# Patient Record
Sex: MALE | URBAN - METROPOLITAN AREA
[De-identification: names, ages, dates, MRNs, and addresses within clinical notes are randomized per-mention and may not be internally consistent; named-entity substitution may affect disease eponyms.]

---

## 2019-02-13 ENCOUNTER — APPOINTMENT (RX ONLY)
Dept: URBAN - METROPOLITAN AREA CLINIC 281 | Facility: CLINIC | Age: 54
Setting detail: DERMATOLOGY
End: 2019-02-13

## 2019-02-13 DIAGNOSIS — L98.8 OTHER SPECIFIED DISORDERS OF THE SKIN AND SUBCUTANEOUS TISSUE: ICD-10-CM

## 2019-02-13 DIAGNOSIS — M67.4 GANGLION: ICD-10-CM

## 2019-02-13 DIAGNOSIS — D22 MELANOCYTIC NEVI: ICD-10-CM

## 2019-02-13 PROBLEM — M67.472 GANGLION, LEFT ANKLE AND FOOT: Status: ACTIVE | Noted: 2019-02-13

## 2019-02-13 PROBLEM — I10 ESSENTIAL (PRIMARY) HYPERTENSION: Status: ACTIVE | Noted: 2019-02-13

## 2019-02-13 PROBLEM — L57.0 ACTINIC KERATOSIS: Status: ACTIVE | Noted: 2019-02-13

## 2019-02-13 PROBLEM — D22.4 MELANOCYTIC NEVI OF SCALP AND NECK: Status: ACTIVE | Noted: 2019-02-13

## 2019-02-13 PROCEDURE — ? COUNSELING

## 2019-02-13 PROCEDURE — 99213 OFFICE O/P EST LOW 20 MIN: CPT

## 2019-02-13 ASSESSMENT — LOCATION ZONE DERM
LOCATION ZONE: LIP
LOCATION ZONE: TOE
LOCATION ZONE: SCALP

## 2019-02-13 ASSESSMENT — LOCATION DETAILED DESCRIPTION DERM
LOCATION DETAILED: LEFT SUPERIOR PARIETAL SCALP
LOCATION DETAILED: RIGHT INFERIOR VERMILION LIP
LOCATION DETAILED: LEFT DORSAL 2ND TOE

## 2019-02-13 ASSESSMENT — LOCATION SIMPLE DESCRIPTION DERM
LOCATION SIMPLE: LEFT 2ND TOE
LOCATION SIMPLE: SCALP
LOCATION SIMPLE: RIGHT LIP

## 2019-08-14 ENCOUNTER — APPOINTMENT (RX ONLY)
Dept: URBAN - METROPOLITAN AREA CLINIC 281 | Facility: CLINIC | Age: 54
Setting detail: DERMATOLOGY
End: 2019-08-14

## 2019-08-14 DIAGNOSIS — Q82.5 CONGENITAL NON-NEOPLASTIC NEVUS: ICD-10-CM

## 2019-08-14 DIAGNOSIS — D22 MELANOCYTIC NEVI: ICD-10-CM

## 2019-08-14 DIAGNOSIS — Z87.2 PERSONAL HISTORY OF DISEASES OF THE SKIN AND SUBCUTANEOUS TISSUE: ICD-10-CM

## 2019-08-14 DIAGNOSIS — L82.1 OTHER SEBORRHEIC KERATOSIS: ICD-10-CM

## 2019-08-14 DIAGNOSIS — L81.4 OTHER MELANIN HYPERPIGMENTATION: ICD-10-CM

## 2019-08-14 PROBLEM — D22.5 MELANOCYTIC NEVI OF TRUNK: Status: ACTIVE | Noted: 2019-08-14

## 2019-08-14 PROCEDURE — 99213 OFFICE O/P EST LOW 20 MIN: CPT

## 2019-08-14 PROCEDURE — ? COUNSELING

## 2019-08-14 PROCEDURE — ? INVENTORY

## 2019-08-14 ASSESSMENT — LOCATION SIMPLE DESCRIPTION DERM
LOCATION SIMPLE: POSTERIOR NECK
LOCATION SIMPLE: RIGHT LOWER BACK
LOCATION SIMPLE: LEFT UPPER BACK
LOCATION SIMPLE: UPPER BACK

## 2019-08-14 ASSESSMENT — LOCATION DETAILED DESCRIPTION DERM
LOCATION DETAILED: RIGHT POSTERIOR NECK
LOCATION DETAILED: RIGHT SUPERIOR LATERAL LOWER BACK
LOCATION DETAILED: LEFT SUPERIOR UPPER BACK
LOCATION DETAILED: INFERIOR THORACIC SPINE

## 2019-08-14 ASSESSMENT — LOCATION ZONE DERM
LOCATION ZONE: TRUNK
LOCATION ZONE: NECK

## 2020-04-29 ENCOUNTER — APPOINTMENT (OUTPATIENT)
Dept: URBAN - METROPOLITAN AREA CLINIC 285 | Age: 55
Setting detail: DERMATOLOGY
End: 2020-04-29

## 2020-04-29 DIAGNOSIS — B08.1 MOLLUSCUM CONTAGIOSUM: ICD-10-CM

## 2020-04-29 PROCEDURE — OTHER SEXUALLY TRANSMITTED INFECTION COUNSELING: OTHER

## 2020-04-29 PROCEDURE — OTHER LIQUID NITROGEN: OTHER

## 2020-04-29 PROCEDURE — 17110 DESTRUCT B9 LESION 1-14: CPT

## 2020-04-29 PROCEDURE — OTHER COUNSELING: OTHER

## 2020-04-29 ASSESSMENT — LOCATION ZONE DERM
LOCATION ZONE: TRUNK
LOCATION ZONE: GENITALIA

## 2020-04-29 ASSESSMENT — LOCATION DETAILED DESCRIPTION DERM
LOCATION DETAILED: SUPRAPUBIC SKIN
LOCATION DETAILED: GENITALIA

## 2020-04-29 ASSESSMENT — LOCATION SIMPLE DESCRIPTION DERM
LOCATION SIMPLE: GENITALIA
LOCATION SIMPLE: GROIN

## 2020-04-29 NOTE — HPI: RASH
How Severe Is Your Rash?: mild
Is This A New Presentation, Or A Follow-Up?: Rash
Additional History: Patient states that he was given mupirocin by [facility] also did std testing to use for two weeks. Patient states that he self diagnosed himself and has been using compound w a couple of times a day. He states that he has been going through a divorce and had been traveling to different places and had a new girlfriend that he is no longer with he thinks he may have mulloscum.

## 2020-04-29 NOTE — PROCEDURE: SEXUALLY TRANSMITTED INFECTION COUNSELING
Detail Level: Detailed
Genital Herpes Counseling: Herpes infection on the genitalia or buttocks is considered a sexually transmitted infection. I recommend screening for syphilis and HIV.
Chlamydia Counseling: Chlamydia is a sexually transmitted infection. I recommend screening for HIV, syphilis and gonorrhea.
Chancroid Counseling: Chancroid is a sexually transmitted infection. I recommend screening for syphilis and HIV.
Syphilis Counseling: Syphilis is a sexually transmitted infection. I recommend screening for HIV.
Condyloma Counseling: Warts on the genitalia in individuals who are sexually active are considered a sexually transmitted infection. I recommend screening for syphilis and HIV.
Hiv Counseling: HIV can be a sexually transmitted infection. I recommend screening for syphilis.
Molluscum Counseling: Molluscum lesions on the genitalia in individuals who are sexually active are considered a sexually transmitted infection.
Reactive Arthritis Counseling: This condition can be caused by a sexually transmitted infection. I recommend screening for HIV, syphilis, gonorrhea and chlamydia.
Gonorrhea Counseling: Granuloma inguinale is a sexually transmitted infection. I recommend screening for HIV, syphilis, gonorrhea and chlamydia.
Patient Not Interested Counseling: The patient is not interested in testing for sexually transmitted infections at this time.
Patient Interested In Sti Testing?: Yes
Trichomonas Counseling: Trichomonas is a sexually transmitted infection. I recommend screening for HIV, syphilis, gonorrhea and chlamydia.
Gonorrhea Counseling: Gonorrhea is a sexually transmitted infection. I recommend screening for HIV, syphilis and chlamydia.

## 2020-05-20 ENCOUNTER — APPOINTMENT (OUTPATIENT)
Dept: URBAN - METROPOLITAN AREA CLINIC 285 | Age: 55
Setting detail: DERMATOLOGY
End: 2020-05-20

## 2020-05-20 DIAGNOSIS — B08.1 MOLLUSCUM CONTAGIOSUM: ICD-10-CM

## 2020-05-20 PROCEDURE — OTHER COUNSELING: OTHER

## 2020-05-20 PROCEDURE — OTHER LIQUID NITROGEN: OTHER

## 2020-05-20 PROCEDURE — 17111 DESTRUCT LESION 15 OR MORE: CPT

## 2020-05-20 ASSESSMENT — LOCATION SIMPLE DESCRIPTION DERM
LOCATION SIMPLE: GENITALIA
LOCATION SIMPLE: PENIS
LOCATION SIMPLE: GROIN

## 2020-05-20 ASSESSMENT — LOCATION ZONE DERM
LOCATION ZONE: PENIS
LOCATION ZONE: GENITALIA
LOCATION ZONE: TRUNK

## 2020-05-20 ASSESSMENT — LOCATION DETAILED DESCRIPTION DERM
LOCATION DETAILED: DORSAL PENILE SHAFT
LOCATION DETAILED: GENITALIA
LOCATION DETAILED: SUPRAPUBIC SKIN

## 2020-06-09 ENCOUNTER — APPOINTMENT (OUTPATIENT)
Dept: URBAN - METROPOLITAN AREA CLINIC 285 | Age: 55
Setting detail: DERMATOLOGY
End: 2020-06-09

## 2020-06-09 DIAGNOSIS — B08.1 MOLLUSCUM CONTAGIOSUM: ICD-10-CM

## 2020-06-09 PROCEDURE — OTHER LIQUID NITROGEN: OTHER

## 2020-06-09 PROCEDURE — OTHER COUNSELING: OTHER

## 2020-06-09 PROCEDURE — 17110 DESTRUCT B9 LESION 1-14: CPT

## 2020-06-09 PROCEDURE — OTHER SEXUALLY TRANSMITTED INFECTION COUNSELING: OTHER

## 2020-06-09 ASSESSMENT — LOCATION ZONE DERM
LOCATION ZONE: GENITALIA
LOCATION ZONE: TRUNK

## 2020-06-09 ASSESSMENT — LOCATION SIMPLE DESCRIPTION DERM
LOCATION SIMPLE: GENITALIA
LOCATION SIMPLE: GROIN

## 2020-06-09 ASSESSMENT — LOCATION DETAILED DESCRIPTION DERM
LOCATION DETAILED: SUPRAPUBIC SKIN
LOCATION DETAILED: GENITALIA

## 2020-06-09 NOTE — PROCEDURE: SEXUALLY TRANSMITTED INFECTION COUNSELING
Generic Sti Counseling: I recommend screening for syphilis and HIV.
Patient Interested In Sti Testing?: Yes
Gonorrhea Counseling: Gonorrhea is a sexually transmitted infection. I recommend screening for HIV, syphilis and chlamydia.
Genital Herpes Counseling: Herpes infection on the genitalia or buttocks is considered a sexually transmitted infection. I recommend screening for syphilis and HIV.
Chancroid Counseling: Chancroid is a sexually transmitted infection. I recommend screening for syphilis and HIV.
Trichomonas Counseling: Trichomonas is a sexually transmitted infection. I recommend screening for HIV, syphilis, gonorrhea and chlamydia.
Reactive Arthritis Counseling: This condition can be caused by a sexually transmitted infection. I recommend screening for HIV, syphilis, gonorrhea and chlamydia.
Patient Not Interested Counseling: The patient is not interested in testing for sexually transmitted infections at this time.
Gonorrhea Counseling: Granuloma inguinale is a sexually transmitted infection. I recommend screening for HIV, syphilis, gonorrhea and chlamydia.
Molluscum Counseling: Molluscum lesions on the genitalia in individuals who are sexually active are considered a sexually transmitted infection. I recommend screening for syphilis and HIV.
Condyloma Counseling: Warts on the genitalia in individuals who are sexually active are considered a sexually transmitted infection. I recommend screening for syphilis and HIV.
Detail Level: Detailed
Chlamydia Counseling: Chlamydia is a sexually transmitted infection. I recommend screening for HIV, syphilis and gonorrhea.
Hiv Counseling: HIV can be a sexually transmitted infection. I recommend screening for syphilis.
Syphilis Counseling: Syphilis is a sexually transmitted infection. I recommend screening for HIV.

## 2020-06-30 ENCOUNTER — APPOINTMENT (OUTPATIENT)
Dept: URBAN - METROPOLITAN AREA CLINIC 285 | Age: 55
Setting detail: DERMATOLOGY
End: 2020-06-30

## 2020-06-30 DIAGNOSIS — B08.1 MOLLUSCUM CONTAGIOSUM: ICD-10-CM

## 2020-06-30 PROCEDURE — OTHER LIQUID NITROGEN: OTHER

## 2020-06-30 PROCEDURE — OTHER SEXUALLY TRANSMITTED INFECTION COUNSELING: OTHER

## 2020-06-30 PROCEDURE — OTHER COUNSELING: OTHER

## 2020-06-30 PROCEDURE — 17110 DESTRUCT B9 LESION 1-14: CPT

## 2020-06-30 ASSESSMENT — LOCATION ZONE DERM
LOCATION ZONE: TRUNK
LOCATION ZONE: PENIS
LOCATION ZONE: LEG

## 2020-06-30 ASSESSMENT — LOCATION SIMPLE DESCRIPTION DERM
LOCATION SIMPLE: PENIS
LOCATION SIMPLE: ABDOMEN
LOCATION SIMPLE: GROIN
LOCATION SIMPLE: LEFT THIGH

## 2020-06-30 ASSESSMENT — LOCATION DETAILED DESCRIPTION DERM
LOCATION DETAILED: VENTRAL PENILE SHAFT
LOCATION DETAILED: SUPRAPUBIC SKIN
LOCATION DETAILED: LEFT ANTERIOR PROXIMAL THIGH
LOCATION DETAILED: LEFT INGUINAL CREASE
LOCATION DETAILED: PERIUMBILICAL SKIN

## 2020-06-30 NOTE — PROCEDURE: SEXUALLY TRANSMITTED INFECTION COUNSELING
Detail Level: Detailed
Chlamydia Counseling: Chlamydia is a sexually transmitted infection. I recommend screening for HIV, syphilis and gonorrhea.
Hiv Counseling: HIV can be a sexually transmitted infection. I recommend screening for syphilis.
Syphilis Counseling: Syphilis is a sexually transmitted infection. I recommend screening for HIV.
Gonorrhea Counseling: Gonorrhea is a sexually transmitted infection. I recommend screening for HIV, syphilis and chlamydia.
Generic Sti Counseling: I recommend screening for syphilis and HIV.
Patient Interested In Sti Testing?: Yes
Genital Herpes Counseling: Herpes infection on the genitalia or buttocks is considered a sexually transmitted infection. I recommend screening for syphilis and HIV.
Chancroid Counseling: Chancroid is a sexually transmitted infection. I recommend screening for syphilis and HIV.
Trichomonas Counseling: Trichomonas is a sexually transmitted infection. I recommend screening for HIV, syphilis, gonorrhea and chlamydia.
Reactive Arthritis Counseling: This condition can be caused by a sexually transmitted infection. I recommend screening for HIV, syphilis, gonorrhea and chlamydia.
Gonorrhea Counseling: Granuloma inguinale is a sexually transmitted infection. I recommend screening for HIV, syphilis, gonorrhea and chlamydia.
Patient Not Interested Counseling: The patient is not interested in testing for sexually transmitted infections at this time.
Molluscum Counseling: Molluscum lesions on the genitalia in individuals who are sexually active are considered a sexually transmitted infection. I recommend screening for syphilis and HIV.
Condyloma Counseling: Warts on the genitalia in individuals who are sexually active are considered a sexually transmitted infection. I recommend screening for syphilis and HIV.

## 2020-06-30 NOTE — PROCEDURE: LIQUID NITROGEN
Consent: The patient's consent was obtained including but not limited to risks of crusting, scabbing, blistering, scarring, darker or lighter pigmentary change, recurrence, incomplete removal and infection.
Medical Necessity Information: It is in your best interest to select a reason for this procedure from the list below. All of these items fulfill various CMS LCD requirements except the new and changing color options.
Detail Level: Detailed
Render Post-Care Instructions In Note?: no
Post-Care Instructions: I reviewed with the patient in detail post-care instructions. Patient is to wear sunprotection, and avoid picking at any of the treated lesions. Pt may apply Vaseline to crusted or scabbing areas.
Medical Necessity Clause: This procedure was medically necessary because the lesions that were treated were:

## 2020-07-14 ENCOUNTER — APPOINTMENT (OUTPATIENT)
Dept: URBAN - METROPOLITAN AREA CLINIC 285 | Age: 55
Setting detail: DERMATOLOGY
End: 2020-07-14

## 2020-07-14 DIAGNOSIS — B08.1 MOLLUSCUM CONTAGIOSUM: ICD-10-CM

## 2020-07-14 DIAGNOSIS — B07.8 OTHER VIRAL WARTS: ICD-10-CM

## 2020-07-14 PROCEDURE — 99213 OFFICE O/P EST LOW 20 MIN: CPT | Mod: 25

## 2020-07-14 PROCEDURE — OTHER SEXUALLY TRANSMITTED INFECTION COUNSELING: OTHER

## 2020-07-14 PROCEDURE — OTHER LIQUID NITROGEN: OTHER

## 2020-07-14 PROCEDURE — OTHER COUNSELING: OTHER

## 2020-07-14 PROCEDURE — 17110 DESTRUCT B9 LESION 1-14: CPT

## 2020-07-14 ASSESSMENT — LOCATION SIMPLE DESCRIPTION DERM
LOCATION SIMPLE: RIGHT PLANTAR SURFACE
LOCATION SIMPLE: PLANTAR SURFACE OF RIGHT 3RD TOE
LOCATION SIMPLE: PENIS
LOCATION SIMPLE: GENITALIA

## 2020-07-14 ASSESSMENT — LOCATION DETAILED DESCRIPTION DERM
LOCATION DETAILED: RIGHT PLANTAR FOREFOOT OVERLYING 4TH METATARSAL
LOCATION DETAILED: GENITALIA
LOCATION DETAILED: RIGHT DORSAL SHAFT OF PENIS
LOCATION DETAILED: RIGHT LATERAL PLANTAR 3RD TOE

## 2020-07-14 ASSESSMENT — LOCATION ZONE DERM
LOCATION ZONE: GENITALIA
LOCATION ZONE: FEET
LOCATION ZONE: TOE
LOCATION ZONE: PENIS

## 2020-07-14 NOTE — PROCEDURE: LIQUID NITROGEN
Post-Care Instructions: I reviewed with the patient in detail post-care instructions. Patient is to wear sunprotection, and avoid picking at any of the treated lesions. Pt may apply Vaseline to crusted or scabbing areas.
Add 52 Modifier (Optional): no
Medical Necessity Clause: This procedure was medically necessary because the lesions that were treated were:
Detail Level: Detailed
Number Of Freeze-Thaw Cycles: 2 freeze-thaw cycles
Consent: The patient's consent was obtained including but not limited to risks of crusting, scabbing, blistering, scarring, darker or lighter pigmentary change, recurrence, incomplete removal and infection.
Medical Necessity Information: It is in your best interest to select a reason for this procedure from the list below. All of these items fulfill various CMS LCD requirements except the new and changing color options.

## 2020-07-14 NOTE — PROCEDURE: SEXUALLY TRANSMITTED INFECTION COUNSELING
Hiv Counseling: HIV can be a sexually transmitted infection. I recommend screening for syphilis.
Syphilis Counseling: Syphilis is a sexually transmitted infection. I recommend screening for HIV.
Gonorrhea Counseling: Gonorrhea is a sexually transmitted infection. I recommend screening for HIV, syphilis and chlamydia.
Generic Sti Counseling: I recommend screening for syphilis and HIV.
Patient Interested In Sti Testing?: Yes
Patient Not Interested Counseling: The patient is not interested in testing for sexually transmitted infections at this time.
Genital Herpes Counseling: Herpes infection on the genitalia or buttocks is considered a sexually transmitted infection. I recommend screening for syphilis and HIV.
Chancroid Counseling: Chancroid is a sexually transmitted infection. I recommend screening for syphilis and HIV.
Molluscum Counseling: Molluscum lesions on the genitalia in individuals who are sexually active are considered a sexually transmitted infection. I recommend screening for syphilis and HIV.
Trichomonas Counseling: Trichomonas is a sexually transmitted infection. I recommend screening for HIV, syphilis, gonorrhea and chlamydia.
Reactive Arthritis Counseling: This condition can be caused by a sexually transmitted infection. I recommend screening for HIV, syphilis, gonorrhea and chlamydia.
Gonorrhea Counseling: Granuloma inguinale is a sexually transmitted infection. I recommend screening for HIV, syphilis, gonorrhea and chlamydia.
Condyloma Counseling: Warts on the genitalia in individuals who are sexually active are considered a sexually transmitted infection. I recommend screening for syphilis and HIV.
Detail Level: Detailed
Chlamydia Counseling: Chlamydia is a sexually transmitted infection. I recommend screening for HIV, syphilis and gonorrhea.

## 2020-08-04 ENCOUNTER — APPOINTMENT (OUTPATIENT)
Dept: URBAN - METROPOLITAN AREA CLINIC 285 | Age: 55
Setting detail: DERMATOLOGY
End: 2020-08-04

## 2020-08-04 DIAGNOSIS — B07.8 OTHER VIRAL WARTS: ICD-10-CM

## 2020-08-04 DIAGNOSIS — B08.1 MOLLUSCUM CONTAGIOSUM: ICD-10-CM

## 2020-08-04 PROCEDURE — OTHER COUNSELING: OTHER

## 2020-08-04 PROCEDURE — OTHER LIQUID NITROGEN: OTHER

## 2020-08-04 PROCEDURE — 17111 DESTRUCT LESION 15 OR MORE: CPT

## 2020-08-04 PROCEDURE — OTHER SEXUALLY TRANSMITTED INFECTION COUNSELING: OTHER

## 2020-08-04 ASSESSMENT — LOCATION ZONE DERM
LOCATION ZONE: FEET
LOCATION ZONE: GENITALIA
LOCATION ZONE: PENIS
LOCATION ZONE: TRUNK
LOCATION ZONE: TOE

## 2020-08-04 ASSESSMENT — LOCATION DETAILED DESCRIPTION DERM
LOCATION DETAILED: RIGHT DORSAL SHAFT OF PENIS
LOCATION DETAILED: RIGHT SCROTUM
LOCATION DETAILED: SUPRAPUBIC SKIN
LOCATION DETAILED: RIGHT PLANTAR FOREFOOT OVERLYING 4TH METATARSAL
LOCATION DETAILED: RIGHT LATERAL PLANTAR 3RD TOE
LOCATION DETAILED: LEFT SCROTUM

## 2020-08-04 ASSESSMENT — LOCATION SIMPLE DESCRIPTION DERM
LOCATION SIMPLE: GROIN
LOCATION SIMPLE: SCROTUM
LOCATION SIMPLE: PLANTAR SURFACE OF RIGHT 3RD TOE
LOCATION SIMPLE: PENIS
LOCATION SIMPLE: RIGHT PLANTAR SURFACE

## 2020-08-04 NOTE — PROCEDURE: SEXUALLY TRANSMITTED INFECTION COUNSELING
Genital Herpes Counseling: Herpes infection on the genitalia or buttocks is considered a sexually transmitted infection. I recommend screening for syphilis and HIV.
Chlamydia Counseling: Chlamydia is a sexually transmitted infection. I recommend screening for HIV, syphilis and gonorrhea.
Trichomonas Counseling: Trichomonas is a sexually transmitted infection. I recommend screening for HIV, syphilis, gonorrhea and chlamydia.
Syphilis Counseling: Syphilis is a sexually transmitted infection. I recommend screening for HIV.
Patient Not Interested Counseling: The patient is not interested in testing for sexually transmitted infections at this time.
Generic Sti Counseling: I recommend screening for syphilis and HIV.
Molluscum Counseling: Molluscum lesions on the genitalia in individuals who are sexually active are considered a sexually transmitted infection. I recommend screening for syphilis and HIV.
Chancroid Counseling: Chancroid is a sexually transmitted infection. I recommend screening for syphilis and HIV.
Reactive Arthritis Counseling: This condition can be caused by a sexually transmitted infection. I recommend screening for HIV, syphilis, gonorrhea and chlamydia.
Gonorrhea Counseling: Granuloma inguinale is a sexually transmitted infection. I recommend screening for HIV, syphilis, gonorrhea and chlamydia.
Detail Level: Detailed
Condyloma Counseling: Warts on the genitalia in individuals who are sexually active are considered a sexually transmitted infection. I recommend screening for syphilis and HIV.
Hiv Counseling: HIV can be a sexually transmitted infection. I recommend screening for syphilis.
Gonorrhea Counseling: Gonorrhea is a sexually transmitted infection. I recommend screening for HIV, syphilis and chlamydia.
Patient Interested In Sti Testing?: Yes

## 2020-08-04 NOTE — PROCEDURE: LIQUID NITROGEN
Consent: The patient's consent was obtained including but not limited to risks of crusting, scabbing, blistering, scarring, darker or lighter pigmentary change, recurrence, incomplete removal and infection.
Add 52 Modifier (Optional): no
Detail Level: Detailed
Number Of Freeze-Thaw Cycles: 2 freeze-thaw cycles
Medical Necessity Clause: This procedure was medically necessary because the lesions that were treated were:
Medical Necessity Information: It is in your best interest to select a reason for this procedure from the list below. All of these items fulfill various CMS LCD requirements except the new and changing color options.
Post-Care Instructions: I reviewed with the patient in detail post-care instructions. Patient is to wear sunprotection, and avoid picking at any of the treated lesions. Pt may apply Vaseline to crusted or scabbing areas.

## 2020-08-21 ENCOUNTER — APPOINTMENT (OUTPATIENT)
Dept: URBAN - METROPOLITAN AREA CLINIC 285 | Age: 55
Setting detail: DERMATOLOGY
End: 2020-08-21

## 2020-08-21 DIAGNOSIS — B08.1 MOLLUSCUM CONTAGIOSUM: ICD-10-CM

## 2020-08-21 DIAGNOSIS — B07.8 OTHER VIRAL WARTS: ICD-10-CM

## 2020-08-21 PROCEDURE — OTHER LIQUID NITROGEN: OTHER

## 2020-08-21 PROCEDURE — OTHER COUNSELING: OTHER

## 2020-08-21 PROCEDURE — 17111 DESTRUCT LESION 15 OR MORE: CPT

## 2020-08-21 PROCEDURE — OTHER SEXUALLY TRANSMITTED INFECTION COUNSELING: OTHER

## 2020-08-21 PROCEDURE — 99213 OFFICE O/P EST LOW 20 MIN: CPT | Mod: 25

## 2020-08-21 ASSESSMENT — LOCATION ZONE DERM
LOCATION ZONE: GENITALIA
LOCATION ZONE: TRUNK
LOCATION ZONE: SCROTUM
LOCATION ZONE: LEG
LOCATION ZONE: TOE
LOCATION ZONE: FEET
LOCATION ZONE: PENIS

## 2020-08-21 ASSESSMENT — LOCATION DETAILED DESCRIPTION DERM
LOCATION DETAILED: RIGHT DORSAL SHAFT OF PENIS
LOCATION DETAILED: RIGHT LATERAL PLANTAR 3RD TOE
LOCATION DETAILED: RIGHT SCROTUM
LOCATION DETAILED: SUPRAPUBIC SKIN
LOCATION DETAILED: EPIGASTRIC SKIN
LOCATION DETAILED: LEFT ANTERIOR PROXIMAL THIGH
LOCATION DETAILED: BASE OF THE PENIS
LOCATION DETAILED: LEFT SCROTUM
LOCATION DETAILED: LEFT INGUINAL CREASE
LOCATION DETAILED: RIGHT ANTERIOR PROXIMAL THIGH
LOCATION DETAILED: SUPERIOR MIDLINE SCROTUM
LOCATION DETAILED: LEFT DORSAL SHAFT OF PENIS
LOCATION DETAILED: RIGHT PLANTAR FOREFOOT OVERLYING 4TH METATARSAL

## 2020-08-21 ASSESSMENT — LOCATION SIMPLE DESCRIPTION DERM
LOCATION SIMPLE: SUPERIOR SCROTUM
LOCATION SIMPLE: SCROTUM
LOCATION SIMPLE: RIGHT PLANTAR SURFACE
LOCATION SIMPLE: PLANTAR SURFACE OF RIGHT 3RD TOE
LOCATION SIMPLE: PENIS
LOCATION SIMPLE: LEFT THIGH
LOCATION SIMPLE: RIGHT THIGH
LOCATION SIMPLE: GROIN
LOCATION SIMPLE: ABDOMEN

## 2020-08-21 NOTE — PROCEDURE: SEXUALLY TRANSMITTED INFECTION COUNSELING
Detail Level: Detailed
Chlamydia Counseling: Chlamydia is a sexually transmitted infection. I recommend screening for HIV, syphilis and gonorrhea.
Hiv Counseling: HIV can be a sexually transmitted infection. I recommend screening for syphilis.
Syphilis Counseling: Syphilis is a sexually transmitted infection. I recommend screening for HIV.
Generic Sti Counseling: I recommend screening for syphilis and HIV.
Patient Interested In Sti Testing?: Yes
Gonorrhea Counseling: Gonorrhea is a sexually transmitted infection. I recommend screening for HIV, syphilis and chlamydia.
Genital Herpes Counseling: Herpes infection on the genitalia or buttocks is considered a sexually transmitted infection. I recommend screening for syphilis and HIV.
Chancroid Counseling: Chancroid is a sexually transmitted infection. I recommend screening for syphilis and HIV.
Trichomonas Counseling: Trichomonas is a sexually transmitted infection. I recommend screening for HIV, syphilis, gonorrhea and chlamydia.
Reactive Arthritis Counseling: This condition can be caused by a sexually transmitted infection. I recommend screening for HIV, syphilis, gonorrhea and chlamydia.
Patient Not Interested Counseling: The patient is not interested in testing for sexually transmitted infections at this time.
Gonorrhea Counseling: Granuloma inguinale is a sexually transmitted infection. I recommend screening for HIV, syphilis, gonorrhea and chlamydia.
Molluscum Counseling: Molluscum lesions on the genitalia in individuals who are sexually active are considered a sexually transmitted infection. I recommend screening for syphilis and HIV.
Condyloma Counseling: Warts on the genitalia in individuals who are sexually active are considered a sexually transmitted infection. I recommend screening for syphilis and HIV.

## 2020-08-21 NOTE — PROCEDURE: LIQUID NITROGEN
Render Note In Bullet Format When Appropriate: No
Post-Care Instructions: I reviewed with the patient in detail post-care instructions. Patient is to wear sunprotection, and avoid picking at any of the treated lesions. Pt may apply Vaseline to crusted or scabbing areas.
Detail Level: Detailed
Medical Necessity Information: It is in your best interest to select a reason for this procedure from the list below. All of these items fulfill various CMS LCD requirements except the new and changing color options.
Number Of Freeze-Thaw Cycles: 2 freeze-thaw cycles
Medical Necessity Clause: This procedure was medically necessary because the lesions that were treated were:
Consent: The patient's consent was obtained including but not limited to risks of crusting, scabbing, blistering, scarring, darker or lighter pigmentary change, recurrence, incomplete removal and infection.

## 2020-09-09 ENCOUNTER — APPOINTMENT (OUTPATIENT)
Dept: URBAN - METROPOLITAN AREA CLINIC 285 | Age: 55
Setting detail: DERMATOLOGY
End: 2020-09-09

## 2020-09-09 DIAGNOSIS — B08.1 MOLLUSCUM CONTAGIOSUM: ICD-10-CM

## 2020-09-09 DIAGNOSIS — B07.8 OTHER VIRAL WARTS: ICD-10-CM

## 2020-09-09 PROCEDURE — 17110 DESTRUCT B9 LESION 1-14: CPT

## 2020-09-09 PROCEDURE — OTHER COUNSELING: OTHER

## 2020-09-09 PROCEDURE — OTHER LIQUID NITROGEN: OTHER

## 2020-09-09 PROCEDURE — OTHER SEXUALLY TRANSMITTED INFECTION COUNSELING: OTHER

## 2020-09-09 ASSESSMENT — LOCATION DETAILED DESCRIPTION DERM
LOCATION DETAILED: RIGHT SUPRAPUBIC REGION
LOCATION DETAILED: RIGHT PENIS BASE
LOCATION DETAILED: RIGHT PLANTAR FOREFOOT OVERLYING 4TH METATARSAL
LOCATION DETAILED: RIGHT LATERAL PLANTAR 3RD TOE
LOCATION DETAILED: PERIUMBILICAL SKIN

## 2020-09-09 ASSESSMENT — LOCATION SIMPLE DESCRIPTION DERM
LOCATION SIMPLE: PLANTAR SURFACE OF RIGHT 3RD TOE
LOCATION SIMPLE: RIGHT PLANTAR SURFACE
LOCATION SIMPLE: RIGHT PENIS
LOCATION SIMPLE: ABDOMEN
LOCATION SIMPLE: RIGHT SUPRAPUBIC REGION

## 2020-09-09 ASSESSMENT — LOCATION ZONE DERM
LOCATION ZONE: FEET
LOCATION ZONE: PENIS
LOCATION ZONE: TOE
LOCATION ZONE: TRUNK

## 2020-09-09 NOTE — PROCEDURE: SEXUALLY TRANSMITTED INFECTION COUNSELING
Detail Level: Detailed
Trichomonas Counseling: Trichomonas is a sexually transmitted infection. I recommend screening for HIV, syphilis, gonorrhea and chlamydia.
Gonorrhea Counseling: Granuloma inguinale is a sexually transmitted infection. I recommend screening for HIV, syphilis, gonorrhea and chlamydia.
Hiv Counseling: HIV can be a sexually transmitted infection. I recommend screening for syphilis.
Syphilis Counseling: Syphilis is a sexually transmitted infection. I recommend screening for HIV.
Gonorrhea Counseling: Gonorrhea is a sexually transmitted infection. I recommend screening for HIV, syphilis and chlamydia.
Reactive Arthritis Counseling: This condition can be caused by a sexually transmitted infection. I recommend screening for HIV, syphilis, gonorrhea and chlamydia.
Chancroid Counseling: Chancroid is a sexually transmitted infection. I recommend screening for syphilis and HIV.
Patient Interested In Sti Testing?: Yes
Patient Not Interested Counseling: The patient is not interested in testing for sexually transmitted infections at this time.
Chlamydia Counseling: Chlamydia is a sexually transmitted infection. I recommend screening for HIV, syphilis and gonorrhea.
Genital Herpes Counseling: Herpes infection on the genitalia or buttocks is considered a sexually transmitted infection. I recommend screening for syphilis and HIV.
Condyloma Counseling: Warts on the genitalia in individuals who are sexually active are considered a sexually transmitted infection. I recommend screening for syphilis and HIV.
Molluscum Counseling: Molluscum lesions on the genitalia in individuals who are sexually active are considered a sexually transmitted infection. I recommend screening for syphilis and HIV.
Generic Sti Counseling: I recommend screening for syphilis and HIV.

## 2020-09-23 ENCOUNTER — APPOINTMENT (OUTPATIENT)
Dept: URBAN - METROPOLITAN AREA CLINIC 285 | Age: 55
Setting detail: DERMATOLOGY
End: 2020-09-23

## 2020-09-23 DIAGNOSIS — B07.8 OTHER VIRAL WARTS: ICD-10-CM

## 2020-09-23 DIAGNOSIS — B08.1 MOLLUSCUM CONTAGIOSUM: ICD-10-CM

## 2020-09-23 PROCEDURE — OTHER SEXUALLY TRANSMITTED INFECTION COUNSELING: OTHER

## 2020-09-23 PROCEDURE — OTHER COUNSELING: OTHER

## 2020-09-23 PROCEDURE — 17110 DESTRUCT B9 LESION 1-14: CPT

## 2020-09-23 PROCEDURE — OTHER LIQUID NITROGEN: OTHER

## 2020-09-23 PROCEDURE — 99213 OFFICE O/P EST LOW 20 MIN: CPT | Mod: 25

## 2020-09-23 ASSESSMENT — LOCATION ZONE DERM
LOCATION ZONE: FEET
LOCATION ZONE: PENIS
LOCATION ZONE: TOE
LOCATION ZONE: TRUNK

## 2020-09-23 ASSESSMENT — LOCATION SIMPLE DESCRIPTION DERM
LOCATION SIMPLE: RIGHT PENIS
LOCATION SIMPLE: PLANTAR SURFACE OF RIGHT 3RD TOE
LOCATION SIMPLE: RIGHT SUPRAPUBIC REGION
LOCATION SIMPLE: RIGHT PLANTAR SURFACE
LOCATION SIMPLE: ABDOMEN

## 2020-09-23 ASSESSMENT — LOCATION DETAILED DESCRIPTION DERM
LOCATION DETAILED: RIGHT PLANTAR FOREFOOT OVERLYING 4TH METATARSAL
LOCATION DETAILED: PERIUMBILICAL SKIN
LOCATION DETAILED: RIGHT LATERAL PLANTAR 3RD TOE
LOCATION DETAILED: RIGHT SUPRAPUBIC REGION
LOCATION DETAILED: RIGHT PENIS BASE

## 2020-09-23 NOTE — PROCEDURE: LIQUID NITROGEN
Render Note In Bullet Format When Appropriate: No
Medical Necessity Information: It is in your best interest to select a reason for this procedure from the list below. All of these items fulfill various CMS LCD requirements except the new and changing color options.
Post-Care Instructions: I reviewed with the patient in detail post-care instructions. Patient is to wear sunprotection, and avoid picking at any of the treated lesions. Pt may apply Vaseline to crusted or scabbing areas.
Consent: The patient's consent was obtained including but not limited to risks of crusting, scabbing, blistering, scarring, darker or lighter pigmentary change, recurrence, incomplete removal and infection.
Detail Level: Detailed
Number Of Freeze-Thaw Cycles: 2 freeze-thaw cycles
Medical Necessity Clause: This procedure was medically necessary because the lesions that were treated were:

## 2020-09-23 NOTE — PROCEDURE: SEXUALLY TRANSMITTED INFECTION COUNSELING
Patient Not Interested Counseling: The patient is not interested in testing for sexually transmitted infections at this time.
Hiv Counseling: HIV can be a sexually transmitted infection. I recommend screening for syphilis.
Patient Interested In Sti Testing?: Yes
Trichomonas Counseling: Trichomonas is a sexually transmitted infection. I recommend screening for HIV, syphilis, gonorrhea and chlamydia.
Condyloma Counseling: Warts on the genitalia in individuals who are sexually active are considered a sexually transmitted infection. I recommend screening for syphilis and HIV.
Gonorrhea Counseling: Gonorrhea is a sexually transmitted infection. I recommend screening for HIV, syphilis and chlamydia.
Gonorrhea Counseling: Granuloma inguinale is a sexually transmitted infection. I recommend screening for HIV, syphilis, gonorrhea and chlamydia.
Chlamydia Counseling: Chlamydia is a sexually transmitted infection. I recommend screening for HIV, syphilis and gonorrhea.
Detail Level: Detailed
Molluscum Counseling: Molluscum lesions on the genitalia in individuals who are sexually active are considered a sexually transmitted infection. I recommend screening for syphilis and HIV.
Syphilis Counseling: Syphilis is a sexually transmitted infection. I recommend screening for HIV.
Generic Sti Counseling: I recommend screening for syphilis and HIV.
Genital Herpes Counseling: Herpes infection on the genitalia or buttocks is considered a sexually transmitted infection. I recommend screening for syphilis and HIV.
Chancroid Counseling: Chancroid is a sexually transmitted infection. I recommend screening for syphilis and HIV.
Reactive Arthritis Counseling: This condition can be caused by a sexually transmitted infection. I recommend screening for HIV, syphilis, gonorrhea and chlamydia.

## 2020-10-23 ENCOUNTER — APPOINTMENT (OUTPATIENT)
Dept: URBAN - METROPOLITAN AREA CLINIC 285 | Age: 55
Setting detail: DERMATOLOGY
End: 2020-10-23

## 2020-10-23 DIAGNOSIS — B07.8 OTHER VIRAL WARTS: ICD-10-CM

## 2020-10-23 DIAGNOSIS — B08.1 MOLLUSCUM CONTAGIOSUM: ICD-10-CM

## 2020-10-23 PROCEDURE — 17110 DESTRUCT B9 LESION 1-14: CPT

## 2020-10-23 PROCEDURE — OTHER SEXUALLY TRANSMITTED INFECTION COUNSELING: OTHER

## 2020-10-23 PROCEDURE — OTHER LIQUID NITROGEN: OTHER

## 2020-10-23 PROCEDURE — OTHER COUNSELING: OTHER

## 2020-10-23 PROCEDURE — 99213 OFFICE O/P EST LOW 20 MIN: CPT | Mod: 25

## 2020-10-23 ASSESSMENT — LOCATION SIMPLE DESCRIPTION DERM
LOCATION SIMPLE: RIGHT SUPRAPUBIC REGION
LOCATION SIMPLE: RIGHT PLANTAR SURFACE
LOCATION SIMPLE: RIGHT PENIS
LOCATION SIMPLE: PLANTAR SURFACE OF RIGHT 3RD TOE

## 2020-10-23 ASSESSMENT — LOCATION ZONE DERM
LOCATION ZONE: TRUNK
LOCATION ZONE: PENIS
LOCATION ZONE: TOE
LOCATION ZONE: FEET

## 2020-10-23 ASSESSMENT — LOCATION DETAILED DESCRIPTION DERM
LOCATION DETAILED: RIGHT PLANTAR FOREFOOT OVERLYING 4TH METATARSAL
LOCATION DETAILED: RIGHT PENIS BASE
LOCATION DETAILED: RIGHT LATERAL PLANTAR 3RD TOE
LOCATION DETAILED: RIGHT SUPRAPUBIC REGION

## 2020-10-23 NOTE — PROCEDURE: LIQUID NITROGEN
Medical Necessity Information: It is in your best interest to select a reason for this procedure from the list below. All of these items fulfill various CMS LCD requirements except the new and changing color options.
Include Z78.9 (Other Specified Conditions Influencing Health Status) As An Associated Diagnosis?: No
Detail Level: Detailed
Consent: The patient's consent was obtained including but not limited to risks of crusting, scabbing, blistering, scarring, darker or lighter pigmentary change, recurrence, incomplete removal and infection.
Medical Necessity Clause: This procedure was medically necessary because the lesions that were treated were:
Number Of Freeze-Thaw Cycles: 2 freeze-thaw cycles
Post-Care Instructions: I reviewed with the patient in detail post-care instructions. Patient is to wear sunprotection, and avoid picking at any of the treated lesions. Pt may apply Vaseline to crusted or scabbing areas.

## 2020-10-23 NOTE — PROCEDURE: SEXUALLY TRANSMITTED INFECTION COUNSELING
Chlamydia Counseling: Chlamydia is a sexually transmitted infection. I recommend screening for HIV, syphilis and gonorrhea.
Gonorrhea Counseling: Gonorrhea is a sexually transmitted infection. I recommend screening for HIV, syphilis and chlamydia.
Patient Interested In Sti Testing?: Yes
Genital Herpes Counseling: Herpes infection on the genitalia or buttocks is considered a sexually transmitted infection. I recommend screening for syphilis and HIV.
Syphilis Counseling: Syphilis is a sexually transmitted infection. I recommend screening for HIV.
Trichomonas Counseling: Trichomonas is a sexually transmitted infection. I recommend screening for HIV, syphilis, gonorrhea and chlamydia.
Gonorrhea Counseling: Granuloma inguinale is a sexually transmitted infection. I recommend screening for HIV, syphilis, gonorrhea and chlamydia.
Generic Sti Counseling: I recommend screening for syphilis and HIV.
Condyloma Counseling: Warts on the genitalia in individuals who are sexually active are considered a sexually transmitted infection. I recommend screening for syphilis and HIV.
Molluscum Counseling: Molluscum lesions on the genitalia in individuals who are sexually active are considered a sexually transmitted infection. I recommend screening for syphilis and HIV.
Chancroid Counseling: Chancroid is a sexually transmitted infection. I recommend screening for syphilis and HIV.
Reactive Arthritis Counseling: This condition can be caused by a sexually transmitted infection. I recommend screening for HIV, syphilis, gonorrhea and chlamydia.
Patient Not Interested Counseling: The patient is not interested in testing for sexually transmitted infections at this time.
Detail Level: Detailed
Hiv Counseling: HIV can be a sexually transmitted infection. I recommend screening for syphilis.

## 2021-03-24 ENCOUNTER — APPOINTMENT (OUTPATIENT)
Dept: URBAN - METROPOLITAN AREA CLINIC 285 | Age: 56
Setting detail: DERMATOLOGY
End: 2021-03-24

## 2021-03-24 DIAGNOSIS — B07.8 OTHER VIRAL WARTS: ICD-10-CM

## 2021-03-24 DIAGNOSIS — B08.1 MOLLUSCUM CONTAGIOSUM: ICD-10-CM

## 2021-03-24 PROCEDURE — 17110 DESTRUCT B9 LESION 1-14: CPT

## 2021-03-24 PROCEDURE — 99212 OFFICE O/P EST SF 10 MIN: CPT | Mod: 25

## 2021-03-24 PROCEDURE — OTHER LIQUID NITROGEN: OTHER

## 2021-03-24 PROCEDURE — OTHER COUNSELING: OTHER

## 2021-03-24 ASSESSMENT — LOCATION SIMPLE DESCRIPTION DERM
LOCATION SIMPLE: PLANTAR SURFACE OF RIGHT 3RD TOE
LOCATION SIMPLE: PENIS

## 2021-03-24 ASSESSMENT — LOCATION DETAILED DESCRIPTION DERM
LOCATION DETAILED: LEFT DORSAL SHAFT OF PENIS
LOCATION DETAILED: RIGHT LATERAL PLANTAR 3RD TOE
LOCATION DETAILED: RIGHT MEDIAL PLANTAR 3RD TOE

## 2021-03-24 ASSESSMENT — LOCATION ZONE DERM
LOCATION ZONE: PENIS
LOCATION ZONE: TOE

## 2021-03-24 NOTE — PROCEDURE: LIQUID NITROGEN
Detail Level: Detailed
Post-Care Instructions: I reviewed with the patient in detail post-care instructions. Patient is to wear sunprotection, and avoid picking at any of the treated lesions. Pt may apply Vaseline to crusted or scabbing areas.
Medical Necessity Clause: This procedure was medically necessary because the lesions that were treated were:
Number Of Freeze-Thaw Cycles: 2 freeze-thaw cycles
Add 52 Modifier (Optional): no
Medical Necessity Information: It is in your best interest to select a reason for this procedure from the list below. All of these items fulfill various CMS LCD requirements except the new and changing color options.
Consent: The patient's consent was obtained including but not limited to risks of crusting, scabbing, blistering, scarring, darker or lighter pigmentary change, recurrence, incomplete removal and infection.

## 2021-04-16 ENCOUNTER — APPOINTMENT (OUTPATIENT)
Dept: URBAN - METROPOLITAN AREA CLINIC 285 | Age: 56
Setting detail: DERMATOLOGY
End: 2021-04-16

## 2021-04-16 DIAGNOSIS — B08.1 MOLLUSCUM CONTAGIOSUM: ICD-10-CM

## 2021-04-16 PROCEDURE — OTHER COUNSELING: OTHER

## 2021-04-16 PROCEDURE — 99212 OFFICE O/P EST SF 10 MIN: CPT

## 2021-04-16 ASSESSMENT — LOCATION SIMPLE DESCRIPTION DERM: LOCATION SIMPLE: PENIS

## 2021-04-16 ASSESSMENT — LOCATION DETAILED DESCRIPTION DERM: LOCATION DETAILED: LEFT DORSAL SHAFT OF PENIS

## 2021-04-16 ASSESSMENT — LOCATION ZONE DERM: LOCATION ZONE: PENIS

## 2021-05-27 ENCOUNTER — APPOINTMENT (RX ONLY)
Dept: URBAN - METROPOLITAN AREA CLINIC 281 | Facility: CLINIC | Age: 56
Setting detail: DERMATOLOGY
End: 2021-05-27

## 2021-05-27 DIAGNOSIS — L738 OTHER SPECIFIED DISEASES OF HAIR AND HAIR FOLLICLES: ICD-10-CM | Status: WORSENING

## 2021-05-27 DIAGNOSIS — L73.9 FOLLICULAR DISORDER, UNSPECIFIED: ICD-10-CM | Status: WORSENING

## 2021-05-27 DIAGNOSIS — L663 OTHER SPECIFIED DISEASES OF HAIR AND HAIR FOLLICLES: ICD-10-CM | Status: WORSENING

## 2021-05-27 PROBLEM — L02.828 FURUNCLE OF OTHER SITES: Status: ACTIVE | Noted: 2021-05-27

## 2021-05-27 PROCEDURE — ? FULL BODY SKIN EXAM - DECLINED

## 2021-05-27 PROCEDURE — ? PRESCRIPTION

## 2021-05-27 PROCEDURE — ? COUNSELING

## 2021-05-27 PROCEDURE — ? PRESCRIPTION MEDICATION MANAGEMENT

## 2021-05-27 PROCEDURE — 99213 OFFICE O/P EST LOW 20 MIN: CPT

## 2021-05-27 RX ORDER — CLINDAMYCIN PHOSPHATE 10 MG/ML
LOTION TOPICAL BID
Qty: 1 | Refills: 1 | Status: ERX | COMMUNITY
Start: 2021-05-27

## 2021-05-27 RX ADMIN — CLINDAMYCIN PHOSPHATE: 10 LOTION TOPICAL at 00:00

## 2021-05-27 ASSESSMENT — LOCATION DETAILED DESCRIPTION DERM: LOCATION DETAILED: DORSAL PENILE SHAFT

## 2021-05-27 ASSESSMENT — LOCATION SIMPLE DESCRIPTION DERM: LOCATION SIMPLE: PENIS

## 2021-05-27 ASSESSMENT — LOCATION ZONE DERM: LOCATION ZONE: PENIS

## 2021-05-27 NOTE — HPI: SKIN LESION
How Severe Is Your Skin Lesion?: mild
Has Your Skin Lesion Been Treated?: not been treated
Is This A New Presentation, Or A Follow-Up?: Skin Lesions
Additional History: Patient is here with lesions of concern on his penis.

## 2021-05-27 NOTE — PROCEDURE: PRESCRIPTION MEDICATION MANAGEMENT
Initiate Treatment: Clindamycin lotion bid until clear
Render In Strict Bullet Format?: No
Detail Level: Zone

## 2021-06-08 ENCOUNTER — RX ONLY (OUTPATIENT)
Age: 56
Setting detail: RX ONLY
End: 2021-06-08

## 2021-06-08 RX ORDER — CLINDAMYCIN PHOSPHATE 10 MG/ML
SOLUTION TOPICAL BID
Qty: 1 | Refills: 0 | Status: ERX | COMMUNITY
Start: 2021-06-08

## 2021-09-14 ENCOUNTER — APPOINTMENT (OUTPATIENT)
Dept: URBAN - METROPOLITAN AREA CLINIC 285 | Age: 56
Setting detail: DERMATOLOGY
End: 2021-09-14

## 2021-09-14 DIAGNOSIS — D18.0 HEMANGIOMA: ICD-10-CM

## 2021-09-14 DIAGNOSIS — D22 MELANOCYTIC NEVI: ICD-10-CM

## 2021-09-14 DIAGNOSIS — L82.1 OTHER SEBORRHEIC KERATOSIS: ICD-10-CM

## 2021-09-14 DIAGNOSIS — L81.4 OTHER MELANIN HYPERPIGMENTATION: ICD-10-CM

## 2021-09-14 PROBLEM — D22.71 MELANOCYTIC NEVI OF RIGHT LOWER LIMB, INCLUDING HIP: Status: ACTIVE | Noted: 2021-09-14

## 2021-09-14 PROBLEM — D22.5 MELANOCYTIC NEVI OF TRUNK: Status: ACTIVE | Noted: 2021-09-14

## 2021-09-14 PROBLEM — D22.4 MELANOCYTIC NEVI OF SCALP AND NECK: Status: ACTIVE | Noted: 2021-09-14

## 2021-09-14 PROBLEM — D22.61 MELANOCYTIC NEVI OF RIGHT UPPER LIMB, INCLUDING SHOULDER: Status: ACTIVE | Noted: 2021-09-14

## 2021-09-14 PROBLEM — D18.01 HEMANGIOMA OF SKIN AND SUBCUTANEOUS TISSUE: Status: ACTIVE | Noted: 2021-09-14

## 2021-09-14 PROCEDURE — 99213 OFFICE O/P EST LOW 20 MIN: CPT

## 2021-09-14 PROCEDURE — OTHER COUNSELING: OTHER

## 2021-09-14 ASSESSMENT — LOCATION DETAILED DESCRIPTION DERM
LOCATION DETAILED: RIGHT MEDIAL UPPER BACK
LOCATION DETAILED: RIGHT ANTERIOR DISTAL THIGH
LOCATION DETAILED: SUPERIOR THORACIC SPINE
LOCATION DETAILED: RIGHT DISTAL CALF
LOCATION DETAILED: RIGHT VENTRAL PROXIMAL FOREARM
LOCATION DETAILED: LEFT MEDIAL SUPERIOR CHEST
LOCATION DETAILED: LEFT DISTAL DORSAL FOREARM
LOCATION DETAILED: RIGHT DISTAL ULNAR DORSAL FOREARM
LOCATION DETAILED: EPIGASTRIC SKIN
LOCATION DETAILED: LEFT SUPERIOR PARIETAL SCALP
LOCATION DETAILED: LEFT INFERIOR ANTERIOR NECK

## 2021-09-14 ASSESSMENT — LOCATION SIMPLE DESCRIPTION DERM
LOCATION SIMPLE: RIGHT UPPER BACK
LOCATION SIMPLE: RIGHT FOREARM
LOCATION SIMPLE: RIGHT THIGH
LOCATION SIMPLE: LEFT FOREARM
LOCATION SIMPLE: CHEST
LOCATION SIMPLE: ABDOMEN
LOCATION SIMPLE: LEFT ANTERIOR NECK
LOCATION SIMPLE: RIGHT CALF
LOCATION SIMPLE: SCALP
LOCATION SIMPLE: UPPER BACK

## 2021-09-14 ASSESSMENT — LOCATION ZONE DERM
LOCATION ZONE: LEG
LOCATION ZONE: ARM
LOCATION ZONE: TRUNK
LOCATION ZONE: NECK
LOCATION ZONE: SCALP

## 2022-10-12 NOTE — PROCEDURE: LIQUID NITROGEN
Render Post-Care Instructions In Note?: no
No
Medical Necessity Clause: This procedure was medically necessary because the lesions that were treated were:
Consent: The patient's consent was obtained including but not limited to risks of crusting, scabbing, blistering, scarring, darker or lighter pigmentary change, recurrence, incomplete removal and infection.
Post-Care Instructions: I reviewed with the patient in detail post-care instructions. Patient is to wear sunprotection, and avoid picking at any of the treated lesions. Pt may apply Vaseline to crusted or scabbing areas.
Medical Necessity Information: It is in your best interest to select a reason for this procedure from the list below. All of these items fulfill various CMS LCD requirements except the new and changing color options.
Detail Level: Detailed

## 2023-02-01 ENCOUNTER — APPOINTMENT (RX ONLY)
Dept: URBAN - NONMETROPOLITAN AREA CLINIC 13 | Facility: CLINIC | Age: 58
Setting detail: DERMATOLOGY
End: 2023-02-01

## 2023-02-01 VITALS — WEIGHT: 220 LBS | HEIGHT: 73 IN

## 2023-02-01 DIAGNOSIS — L82.1 OTHER SEBORRHEIC KERATOSIS: ICD-10-CM

## 2023-02-01 DIAGNOSIS — Z71.89 OTHER SPECIFIED COUNSELING: ICD-10-CM

## 2023-02-01 DIAGNOSIS — D18.0 HEMANGIOMA: ICD-10-CM

## 2023-02-01 DIAGNOSIS — L57.8 OTHER SKIN CHANGES DUE TO CHRONIC EXPOSURE TO NONIONIZING RADIATION: ICD-10-CM

## 2023-02-01 DIAGNOSIS — D22 MELANOCYTIC NEVI: ICD-10-CM

## 2023-02-01 DIAGNOSIS — L57.0 ACTINIC KERATOSIS: ICD-10-CM

## 2023-02-01 PROBLEM — D22.5 MELANOCYTIC NEVI OF TRUNK: Status: ACTIVE | Noted: 2023-02-01

## 2023-02-01 PROBLEM — D18.01 HEMANGIOMA OF SKIN AND SUBCUTANEOUS TISSUE: Status: ACTIVE | Noted: 2023-02-01

## 2023-02-01 PROCEDURE — ? TREATMENT REGIMEN

## 2023-02-01 PROCEDURE — ? COUNSELING

## 2023-02-01 PROCEDURE — 99203 OFFICE O/P NEW LOW 30 MIN: CPT

## 2023-02-01 ASSESSMENT — LOCATION SIMPLE DESCRIPTION DERM
LOCATION SIMPLE: RIGHT UPPER BACK
LOCATION SIMPLE: RIGHT LIP
LOCATION SIMPLE: LEFT UPPER BACK

## 2023-02-01 ASSESSMENT — LOCATION DETAILED DESCRIPTION DERM
LOCATION DETAILED: RIGHT INFERIOR VERMILION LIP
LOCATION DETAILED: LEFT SUPERIOR UPPER BACK
LOCATION DETAILED: RIGHT MID-UPPER BACK
LOCATION DETAILED: LEFT MID-UPPER BACK
LOCATION DETAILED: RIGHT INFERIOR UPPER BACK
LOCATION DETAILED: RIGHT SUPERIOR UPPER BACK

## 2023-02-01 ASSESSMENT — LOCATION ZONE DERM
LOCATION ZONE: TRUNK
LOCATION ZONE: LIP

## 2023-02-01 NOTE — HPI: FULL BODY SKIN EXAMINATION
What Type Of Note Output Would You Prefer (Optional)?: Standard Output
What Is The Reason For Today's Visit?: Full Body Skin Examination
What Is The Reason For Today's Visit? (Being Monitored For X): concerning skin lesions on an annual basis
Additional History: He had a FBSE one year ago.

## 2023-02-01 NOTE — PROCEDURE: TREATMENT REGIMEN
Initiate Treatment: monitoring - call if returns or is persistent - R lower lip margin
Detail Level: Zone

## 2024-10-22 ENCOUNTER — APPOINTMENT (RX ONLY)
Dept: URBAN - NONMETROPOLITAN AREA CLINIC 13 | Facility: CLINIC | Age: 59
Setting detail: DERMATOLOGY
End: 2024-10-22

## 2024-10-22 DIAGNOSIS — D22 MELANOCYTIC NEVI: ICD-10-CM

## 2024-10-22 DIAGNOSIS — D18.0 HEMANGIOMA: ICD-10-CM

## 2024-10-22 DIAGNOSIS — M71 OTHER BURSOPATHIES: ICD-10-CM

## 2024-10-22 DIAGNOSIS — L57.8 OTHER SKIN CHANGES DUE TO CHRONIC EXPOSURE TO NONIONIZING RADIATION: ICD-10-CM

## 2024-10-22 PROBLEM — D18.01 HEMANGIOMA OF SKIN AND SUBCUTANEOUS TISSUE: Status: ACTIVE | Noted: 2024-10-22

## 2024-10-22 PROBLEM — M71.371 OTHER BURSAL CYST, RIGHT ANKLE AND FOOT: Status: ACTIVE | Noted: 2024-10-22

## 2024-10-22 PROBLEM — D48.5 NEOPLASM OF UNCERTAIN BEHAVIOR OF SKIN: Status: ACTIVE | Noted: 2024-10-22

## 2024-10-22 PROBLEM — D22.5 MELANOCYTIC NEVI OF TRUNK: Status: ACTIVE | Noted: 2024-10-22

## 2024-10-22 PROCEDURE — ? SUNSCREEN RECOMMENDATIONS

## 2024-10-22 PROCEDURE — 11102 TANGNTL BX SKIN SINGLE LES: CPT

## 2024-10-22 PROCEDURE — ? COUNSELING

## 2024-10-22 PROCEDURE — 99213 OFFICE O/P EST LOW 20 MIN: CPT | Mod: 25

## 2024-10-22 PROCEDURE — ? BIOPSY BY SHAVE METHOD

## 2024-10-22 ASSESSMENT — LOCATION SIMPLE DESCRIPTION DERM
LOCATION SIMPLE: LEFT FOREHEAD
LOCATION SIMPLE: RIGHT 2ND TOE
LOCATION SIMPLE: RIGHT BACK
LOCATION SIMPLE: LEFT UPPER BACK

## 2024-10-22 ASSESSMENT — LOCATION DETAILED DESCRIPTION DERM
LOCATION DETAILED: LEFT INFERIOR FOREHEAD
LOCATION DETAILED: LEFT SUPERIOR UPPER BACK
LOCATION DETAILED: RIGHT DORSAL 2ND TOE
LOCATION DETAILED: RIGHT SUPERIOR LATERAL UPPER BACK

## 2024-10-22 ASSESSMENT — LOCATION ZONE DERM
LOCATION ZONE: TRUNK
LOCATION ZONE: TOE
LOCATION ZONE: FACE

## 2024-10-22 NOTE — PROCEDURE: REASSURANCE
Detail Level: Generalized
Include Location In Plan?: No
Additional Notes (Optional): - if patient want treatment, we can send a referral. Patient does not want treatment as of now.
Detail Level: Zone

## 2025-02-21 NOTE — HPI: SKIN LESIONS
How Severe Is Your Skin Lesion?: mild
Is This A New Presentation, Or A Follow-Up?: Growth
[Time Spent: ___ minutes] : I have spent [unfilled] minutes of time on the encounter which excludes teaching and separately reported services.
Additional History: PCP wanted the area checked